# Patient Record
Sex: MALE | Race: WHITE | NOT HISPANIC OR LATINO | Employment: OTHER | URBAN - NONMETROPOLITAN AREA
[De-identification: names, ages, dates, MRNs, and addresses within clinical notes are randomized per-mention and may not be internally consistent; named-entity substitution may affect disease eponyms.]

---

## 2023-01-01 ENCOUNTER — TELEMEDICINE (OUTPATIENT)
Dept: PRIMARY CARE | Facility: CLINIC | Age: 78
End: 2023-01-01
Payer: MEDICARE

## 2023-01-01 ENCOUNTER — OFFICE VISIT (OUTPATIENT)
Dept: PRIMARY CARE | Facility: CLINIC | Age: 78
End: 2023-01-01
Payer: MEDICARE

## 2023-01-01 VITALS
HEIGHT: 65 IN | BODY MASS INDEX: 28.17 KG/M2 | SYSTOLIC BLOOD PRESSURE: 103 MMHG | DIASTOLIC BLOOD PRESSURE: 66 MMHG | HEART RATE: 79 BPM | OXYGEN SATURATION: 94 %

## 2023-01-01 VITALS
OXYGEN SATURATION: 98 % | HEIGHT: 65 IN | DIASTOLIC BLOOD PRESSURE: 81 MMHG | HEART RATE: 64 BPM | WEIGHT: 169.3 LBS | BODY MASS INDEX: 28.21 KG/M2 | SYSTOLIC BLOOD PRESSURE: 153 MMHG

## 2023-01-01 DIAGNOSIS — M25.561 ACUTE PAIN OF RIGHT KNEE: Primary | ICD-10-CM

## 2023-01-01 DIAGNOSIS — M25.561 ACUTE PAIN OF RIGHT KNEE: ICD-10-CM

## 2023-01-01 DIAGNOSIS — M25.551 RIGHT HIP PAIN: ICD-10-CM

## 2023-01-01 DIAGNOSIS — D64.9 ANEMIA, UNSPECIFIED TYPE: Primary | ICD-10-CM

## 2023-01-01 DIAGNOSIS — G47.09 OTHER INSOMNIA: ICD-10-CM

## 2023-01-01 DIAGNOSIS — M17.11 ARTHRITIS OF KNEE, RIGHT: ICD-10-CM

## 2023-01-01 DIAGNOSIS — M25.551 RIGHT HIP PAIN: Primary | ICD-10-CM

## 2023-01-01 DIAGNOSIS — M16.11 ARTHRITIS OF RIGHT HIP: ICD-10-CM

## 2023-01-01 DIAGNOSIS — G47.09 OTHER INSOMNIA: Primary | ICD-10-CM

## 2023-01-01 DIAGNOSIS — I10 BENIGN ESSENTIAL HTN: ICD-10-CM

## 2023-01-01 LAB
ALANINE AMINOTRANSFERASE (SGPT) (U/L) IN SER/PLAS: 107 U/L (ref 10–52)
ALBUMIN (G/DL) IN SER/PLAS: 3.1 G/DL (ref 3.4–5)
ALKALINE PHOSPHATASE (U/L) IN SER/PLAS: 96 U/L (ref 33–136)
ANION GAP IN SER/PLAS: 17 MMOL/L (ref 10–20)
ASPARTATE AMINOTRANSFERASE (SGOT) (U/L) IN SER/PLAS: 125 U/L (ref 9–39)
BILIRUBIN TOTAL (MG/DL) IN SER/PLAS: 1.4 MG/DL (ref 0–1.2)
CALCIUM (MG/DL) IN SER/PLAS: 7.7 MG/DL (ref 8.6–10.3)
CARBON DIOXIDE, TOTAL (MMOL/L) IN SER/PLAS: 20 MMOL/L (ref 21–32)
CHLORIDE (MMOL/L) IN SER/PLAS: 102 MMOL/L (ref 98–107)
CREATININE (MG/DL) IN SER/PLAS: 1.22 MG/DL (ref 0.5–1.3)
ERYTHROCYTE DISTRIBUTION WIDTH (RATIO) BY AUTOMATED COUNT: 15.9 % (ref 11.5–14.5)
ERYTHROCYTE MEAN CORPUSCULAR HEMOGLOBIN CONCENTRATION (G/DL) BY AUTOMATED: 31.8 G/DL (ref 32–36)
ERYTHROCYTE MEAN CORPUSCULAR VOLUME (FL) BY AUTOMATED COUNT: 84 FL (ref 80–100)
ERYTHROCYTES (10*6/UL) IN BLOOD BY AUTOMATED COUNT: 4.17 X10E12/L (ref 4.5–5.9)
GFR MALE: 60 ML/MIN/1.73M2
GLUCOSE (MG/DL) IN SER/PLAS: 112 MG/DL (ref 74–99)
HEMATOCRIT (%) IN BLOOD BY AUTOMATED COUNT: 35.2 % (ref 41–52)
HEMOGLOBIN (G/DL) IN BLOOD: 11.2 G/DL (ref 13.5–17.5)
LEUKOCYTES (10*3/UL) IN BLOOD BY AUTOMATED COUNT: 8.3 X10E9/L (ref 4.4–11.3)
PLATELETS (10*3/UL) IN BLOOD AUTOMATED COUNT: 98 X10E9/L (ref 150–450)
POTASSIUM (MMOL/L) IN SER/PLAS: 4.1 MMOL/L (ref 3.5–5.3)
PROTEIN TOTAL: 5.5 G/DL (ref 6.4–8.2)
SODIUM (MMOL/L) IN SER/PLAS: 135 MMOL/L (ref 136–145)
UREA NITROGEN (MG/DL) IN SER/PLAS: 35 MG/DL (ref 6–23)

## 2023-01-01 PROCEDURE — 1159F MED LIST DOCD IN RCRD: CPT | Performed by: PHYSICIAN ASSISTANT

## 2023-01-01 PROCEDURE — 3074F SYST BP LT 130 MM HG: CPT | Performed by: PHYSICIAN ASSISTANT

## 2023-01-01 PROCEDURE — 99213 OFFICE O/P EST LOW 20 MIN: CPT | Performed by: PHYSICIAN ASSISTANT

## 2023-01-01 PROCEDURE — 1036F TOBACCO NON-USER: CPT | Performed by: PHYSICIAN ASSISTANT

## 2023-01-01 PROCEDURE — 3078F DIAST BP <80 MM HG: CPT | Performed by: PHYSICIAN ASSISTANT

## 2023-01-01 PROCEDURE — 1159F MED LIST DOCD IN RCRD: CPT | Performed by: FAMILY MEDICINE

## 2023-01-01 PROCEDURE — 1160F RVW MEDS BY RX/DR IN RCRD: CPT | Performed by: PHYSICIAN ASSISTANT

## 2023-01-01 PROCEDURE — 99213 OFFICE O/P EST LOW 20 MIN: CPT | Performed by: FAMILY MEDICINE

## 2023-01-01 PROCEDURE — 99441 PR PHYS/QHP TELEPHONE EVALUATION 5-10 MIN: CPT | Performed by: FAMILY MEDICINE

## 2023-01-01 PROCEDURE — 1036F TOBACCO NON-USER: CPT | Performed by: FAMILY MEDICINE

## 2023-01-01 RX ORDER — MIRTAZAPINE 15 MG/1
7.5 TABLET, FILM COATED ORAL 2 TIMES WEEKLY
Qty: 60 TABLET | Refills: 0
Start: 2023-01-01 | End: 2023-01-01 | Stop reason: ALTCHOICE

## 2023-01-01 RX ORDER — METAPROTERENOL SULFATE 10 MG
1 TABLET ORAL 2 TIMES DAILY
COMMUNITY
Start: 2022-01-01

## 2023-01-01 RX ORDER — FUROSEMIDE 20 MG/1
1 TABLET ORAL DAILY
COMMUNITY
Start: 2022-01-01

## 2023-01-01 RX ORDER — LISINOPRIL 5 MG/1
5 TABLET ORAL DAILY
COMMUNITY
End: 2023-01-01 | Stop reason: SDUPTHER

## 2023-01-01 RX ORDER — LISINOPRIL 5 MG/1
5 TABLET ORAL DAILY
Qty: 90 TABLET | Refills: 1 | Status: SHIPPED | OUTPATIENT
Start: 2023-01-01 | End: 2023-07-28 | Stop reason: CLARIF

## 2023-01-01 RX ORDER — FERROUS SULFATE 325(65) MG
1 TABLET ORAL DAILY
COMMUNITY
Start: 2022-01-01

## 2023-01-01 RX ORDER — ASPIRIN 81 MG/1
81 TABLET ORAL
COMMUNITY
Start: 2015-10-13 | End: 2023-01-01 | Stop reason: ALTCHOICE

## 2023-01-01 RX ORDER — MIRTAZAPINE 15 MG/1
15 TABLET, FILM COATED ORAL NIGHTLY
Qty: 30 TABLET | Refills: 0 | Status: SHIPPED | OUTPATIENT
Start: 2023-01-01 | End: 2023-01-01 | Stop reason: SDUPTHER

## 2023-01-01 RX ORDER — MIRTAZAPINE 15 MG/1
1 TABLET, FILM COATED ORAL NIGHTLY
COMMUNITY
Start: 2022-01-01 | End: 2023-01-01 | Stop reason: SINTOL

## 2023-01-01 RX ORDER — MIRTAZAPINE 15 MG/1
1 TABLET, FILM COATED ORAL NIGHTLY
COMMUNITY
Start: 2022-07-15 | End: 2023-01-01 | Stop reason: SDUPTHER

## 2023-01-01 ASSESSMENT — ENCOUNTER SYMPTOMS
OCCASIONAL FEELINGS OF UNSTEADINESS: 0
DEPRESSION: 0
LOSS OF SENSATION IN FEET: 0

## 2023-01-01 ASSESSMENT — PATIENT HEALTH QUESTIONNAIRE - PHQ9
1. LITTLE INTEREST OR PLEASURE IN DOING THINGS: NOT AT ALL
2. FEELING DOWN, DEPRESSED OR HOPELESS: NOT AT ALL
SUM OF ALL RESPONSES TO PHQ9 QUESTIONS 1 AND 2: 0

## 2023-06-06 NOTE — PROGRESS NOTES
"Subjective     Ovidio Slater is a 77 y.o. male who presents for Knee Pain (Difficulty with ambulation after twisting it).      HPI  The patient is a 77 year-old male presenting to the clinic for a check up.  Patient presents with complaints of knee and hip pain.  Patient is interested in attending physical therapy.  Order placed for Physical Therapy 6/6/2023.    Comes with his wife.  Complaining pain in the right knee and right hip more in the right knee than right hip and mainly the right knee is causing difficulty with the walking.  Denies trauma.  Denies injury.  Educated on overweight and diet and exercise.  Worse with liquid educated on insomnia and sleep hygiene.  Review of Systems  Review of systems:    General:  Denies fever.  Denies chills.    HEENT: Denies nasal congestion.  Denies sinus pressure.    Respiratory:  Denies cough.  Denies shortness of breath.    Cardiovascular:  Denies chest pain.  Denies heart palpitations.  Denies shortness of breath.    Gastrointestinal:  Denies nausea vomiting diarrhea.  Denies abdominal pain.    Genitourinary: Denies burning urination.  Denies frequent urination.  Denies flank pain.  Denies blood in the urine.  Denies abnormal vaginal discharge.    Neurology:  Denies tingling numbness but denies weakness.  Denies headache.  Denies blurred vision.    Musculoskeletal:  Dictated as above.    Endocrinology:  Denies cold intolerance.  Denies hot intolerance.    Psychiatric:  Denies depression.  Denies anxiety.  Denies suicidal.  Denies homicidal.      Objective   /81 (BP Location: Right arm)   Pulse 64   Ht 1.651 m (5' 5\")   Wt 76.8 kg (169 lb 4.8 oz)   SpO2 98%   BMI 28.17 kg/m²        Physical Exam  General.  Not in distress.  HEENT normocephalic anicteric sclerae.  Neck soft supple no thyromegaly.  No carotid bruit.  Lungs are clear.  Heart regular.  Abdomen soft nontender nondistended bowel sounds are positive.  Extremities no clubbing cyanosis or edema.  " Psychiatric.  Has good eye contact.  No crying spells noted.  Speech was normal.  Denies depression.  Denies suicidal.  Denies homicidal.  Musculoskeletal.  Right knee exam.  Complaining pain with flexion and extension.  Tenderness noted on the medial aspect of right knee.    Right hip exam.  Tenderness noted on the lateral aspect of right hip.      Assessment/Plan   1.  Acute pain of right knee.  Educated on the exercises.  We will follow-up on the x-ray.  We will follow-up on physical therapy.    2.  Overweight.  Educated on diet and exercise.  Advised to lose weight 3.  Insomnia.  Educated on sleep hygiene and insomnia..  Continue current medication.    4.  Right hip pain.  Educated on hip exercises.  We will follow-up on x-ray but will follow up on discomfort.               Problem List Items Addressed This Visit    None  Visit Diagnoses       Acute pain of right knee    -  Primary    Relevant Orders    Referral to Physical Therapy    BMI 28.0-28.9,adult        Other insomnia        Right hip pain        Relevant Orders    Referral to Physical Therapy          Scribe Attestation  By signing my name below, IKelli Scribe   attest that this documentation has been prepared under the direction and in the presence of Lobo Neri MD.

## 2023-06-09 NOTE — PROGRESS NOTES
Subjective     Ovidio Slater is a 77 y.o. male who presents for No chief complaint on file..   This was completed via telephone due to the restrictions of the COVID-19 pandemic.  All issues as below were discussed and addressed but no physical exam was performed.  If it was felt that the patient should be evaluated in clinic then they were director there.  The patient/parent verbally consented to the visit.  Had attended telephone call appointment with the patient's wife    HPI  The patient is a 77 year-old male presenting virtually for follow up on x-ray results.  I have been experiencing right hip pain and right knee pain.  I have reviewed x-ray results.  Still experiencing pain and pain scale 6-7/10.  Educated on overweight and diet and exercise.  Advised to lose weight.    Review of Systems  Review of systems:    General:  Denies fever.  Denies chills.    HEENT: Denies nasal congestion.  Denies sinus pressure.    Respiratory:  Denies cough.  Denies shortness of breath.    Cardiovascular:  Denies chest pain.  Denies heart palpitations.  Denies shortness of breath.    Gastrointestinal:  Denies nausea vomiting diarrhea.  Denies abdominal pain.    Genitourinary: Denies burning urination.  Denies frequent urination.  Denies flank pain.  Denies blood in the urine.     Neurology:  Denies tingling numbness but denies weakness.  Denies headache.  Denies blurred vision.    Musculoskeletal: Dictated as above  Endocrinology:  Denies cold intolerance.  Denies hot intolerance.    Psychiatric:  Denies depression.  Denies anxiety.  Denies suicidal.  Denies homicidal.      Objective   Virtual visit  Physical Exam  No physical exam preformed D/T virtual visit.       Assessment/Plan     1.  Right hip pain.  Educated on hip exercises.  Reviewed x-ray results.  Recommended and referred to the orthopedic surgeon.    2.  Right knee pain.  Educated on knee exercises.  Reviewed x-ray results.  Advised to keep appointment with the  orthopedic surgeon.    3.  Overweight.  Educated on diet and exercise.  Advised to lose weight.    I have addressed all the questions and concerns.          Time spent on phone was 7 minutes                Problem List Items Addressed This Visit    None

## 2023-07-10 PROBLEM — I10 BENIGN ESSENTIAL HTN: Status: ACTIVE | Noted: 2023-01-01

## 2023-07-10 PROBLEM — M16.11 ARTHRITIS OF RIGHT HIP: Status: ACTIVE | Noted: 2023-01-01

## 2023-07-10 PROBLEM — M17.11 ARTHRITIS OF KNEE, RIGHT: Status: ACTIVE | Noted: 2023-01-01

## 2023-07-10 NOTE — PROGRESS NOTES
Subjective   Patient ID:   Ovidio Slater is a 77 y.o. male who presents for Hypertension and Med Refill.  HPI  New patient here today to establish care with myself.  Last PCP: Dr. Neri  Last seen: Feb 2023.    HTN:  Taking Lisinopril, Lasix.  BP today is 103/66.  Denies dizziness, headaches.    Anemia:  Taking iron supplement.  Hemoglobin was stable on last check in Sep 2022.  Iron was last checked in Feb 2023.    Arthritis of right knee, right hip:  Seeing ortho.    Health maintenance:  Smoking: Never a smoker.  PSA (50+): Feb 2023.  Labs: Feb 2023.  Influenza:    Review of Systems  12 point review of systems negative unless stated above in HPI    Vitals:    07/18/23 1258   BP: 103/66   Pulse: 79   SpO2: 94%       Physical Exam  General: Alert and oriented, well nourished, no acute distress.  Lungs: Clear to auscultation, non-labored respiration.  Heart: Normal rate, regular rhythm, no murmur, gallop or edema.  Neurologic: Awake, alert, and oriented X3, CN II-XII intact.  Psychiatric: Cooperative, appropriate mood and affect.    Assessment/Plan   It was nice meeting you!  Continue specialty care.  I have refilled your medication.  Consider checking the BP at home - it is a tad low today.  Continue the same medications.  Chronic conditions are stable.  Call with questions or concerns.    F/u  6 months  Diagnoses and all orders for this visit:  Anemia, unspecified type  Arthritis of knee, right  Arthritis of right hip  Benign essential HTN  -     lisinopril 5 mg tablet; Take 1 tablet (5 mg) by mouth once daily.

## 2023-07-14 PROBLEM — Z86.010 HISTORY OF ADENOMATOUS POLYP OF COLON: Status: ACTIVE | Noted: 2022-02-20

## 2023-07-14 PROBLEM — E78.5 DYSLIPIDEMIA: Status: ACTIVE | Noted: 2023-01-01

## 2023-07-14 PROBLEM — R20.2 TINGLING: Status: ACTIVE | Noted: 2023-01-01

## 2023-07-14 PROBLEM — R53.82 CHRONIC FATIGUE: Status: ACTIVE | Noted: 2023-01-01

## 2023-07-14 PROBLEM — E55.9 VITAMIN D DEFICIENCY: Status: ACTIVE | Noted: 2023-01-01

## 2023-07-14 PROBLEM — R60.0 BILATERAL LEG EDEMA: Status: ACTIVE | Noted: 2023-01-01

## 2023-07-14 PROBLEM — G47.00 INSOMNIA: Status: ACTIVE | Noted: 2023-01-01

## 2023-07-14 PROBLEM — M25.561 PAIN IN BOTH KNEES: Status: ACTIVE | Noted: 2023-01-01

## 2023-07-14 PROBLEM — M25.562 PAIN IN BOTH KNEES: Status: ACTIVE | Noted: 2023-01-01
